# Patient Record
Sex: FEMALE | Race: WHITE | NOT HISPANIC OR LATINO | Employment: OTHER | URBAN - METROPOLITAN AREA
[De-identification: names, ages, dates, MRNs, and addresses within clinical notes are randomized per-mention and may not be internally consistent; named-entity substitution may affect disease eponyms.]

---

## 2024-01-22 ENCOUNTER — TELEMEDICINE (OUTPATIENT)
Dept: PRIMARY CARE | Facility: CLINIC | Age: 69
End: 2024-01-22
Payer: COMMERCIAL

## 2024-01-22 DIAGNOSIS — Z00.00 HEALTH CARE MAINTENANCE: Primary | ICD-10-CM

## 2024-01-22 DIAGNOSIS — J20.9 CHRONIC BRONCHITIS WITH ACUTE EXACERBATION (MULTI): ICD-10-CM

## 2024-01-22 DIAGNOSIS — J42 CHRONIC BRONCHITIS WITH ACUTE EXACERBATION (MULTI): ICD-10-CM

## 2024-01-22 DIAGNOSIS — R53.83 OTHER FATIGUE: ICD-10-CM

## 2024-01-22 PROCEDURE — 99213 OFFICE O/P EST LOW 20 MIN: CPT | Performed by: INTERNAL MEDICINE

## 2024-01-22 RX ORDER — AZITHROMYCIN 250 MG/1
TABLET, FILM COATED ORAL
Qty: 6 TABLET | Refills: 0 | Status: SHIPPED | OUTPATIENT
Start: 2024-01-22 | End: 2024-01-27

## 2024-01-22 NOTE — PROGRESS NOTES
Subjective   Patient ID: Lizbet Gonzalez is a 68 y.o. female who presents for No chief complaint on file..    HPI virtual video visit This visit was completed via audio and visual secondary to the restrictions of the COVID-19 pandemic all medical issues were addressed and discussed with patient patient was not otherwise examined it was felt that the patient needed to be seen in the office they would have been referred to do so patient verbally consented to visit  Sick visit three months of coughing have been ongoing little in the way fever sometimes some productive no chest pain no shortness of breath over-the-counter medications have not been as helpful rundown with coughing and other issues    Review of Systems    Objective   There were no vitals taken for this visit.    Physical Exam alert and oriented x 3 breathing unlabored not otherwise examined    Assessment/Plan  impression CB with cough fatigue  Plan discussed with rechecking on blood work had mentioned with losing weight and Ozempic discussed with prior blood work and encouraged for rechecking overall health lungs and cholesterol she had questions about dermatology good overall diet regular exercise increase water consumption okay for Mucinex twice daily okay for Zithromax 250 mg tablet take 2 tablets first day 1 for each of the next 4 days #1 traditional z neelam films if no better or follow-up with pulmonary and recheck with Derm pulmonary and follow-up for regular visit and blood work and consideration of other medications

## 2024-02-06 ENCOUNTER — OFFICE VISIT (OUTPATIENT)
Dept: PRIMARY CARE | Facility: CLINIC | Age: 69
End: 2024-02-06
Payer: COMMERCIAL

## 2024-02-06 VITALS — WEIGHT: 198 LBS | DIASTOLIC BLOOD PRESSURE: 80 MMHG | SYSTOLIC BLOOD PRESSURE: 140 MMHG

## 2024-02-06 DIAGNOSIS — Z00.00 HEALTH CARE MAINTENANCE: Primary | ICD-10-CM

## 2024-02-06 DIAGNOSIS — E78.2 MIXED HYPERLIPIDEMIA: ICD-10-CM

## 2024-02-06 DIAGNOSIS — K76.0 FATTY LIVER: ICD-10-CM

## 2024-02-06 DIAGNOSIS — Z12.11 ENCOUNTER FOR SCREENING FOR MALIGNANT NEOPLASM OF COLON: ICD-10-CM

## 2024-02-06 DIAGNOSIS — R03.0 BLOOD PRESSURE ELEVATED WITHOUT HISTORY OF HTN: ICD-10-CM

## 2024-02-06 DIAGNOSIS — R73.02 GLUCOSE INTOLERANCE (IMPAIRED GLUCOSE TOLERANCE): ICD-10-CM

## 2024-02-06 PROCEDURE — 1160F RVW MEDS BY RX/DR IN RCRD: CPT | Performed by: INTERNAL MEDICINE

## 2024-02-06 PROCEDURE — G0439 PPPS, SUBSEQ VISIT: HCPCS | Performed by: INTERNAL MEDICINE

## 2024-02-06 PROCEDURE — 1159F MED LIST DOCD IN RCRD: CPT | Performed by: INTERNAL MEDICINE

## 2024-02-06 PROCEDURE — 1036F TOBACCO NON-USER: CPT | Performed by: INTERNAL MEDICINE

## 2024-02-06 PROCEDURE — 1170F FXNL STATUS ASSESSED: CPT | Performed by: INTERNAL MEDICINE

## 2024-02-06 PROCEDURE — 99214 OFFICE O/P EST MOD 30 MIN: CPT | Performed by: INTERNAL MEDICINE

## 2024-02-06 NOTE — PROGRESS NOTES
Subjective   Patient ID: Lizbet Gonzalez is a 68 y.o. female who presents for No chief complaint on file..    HPI CPE see updated front sheet her cough and cold symptoms have cleared she has been seeing the dermatologist for her acne and in doing so was begun on Ozempic she is on 0.25 mg that is the only other medicine she takes besides her oral antibiotic for the above feels well started out at around 210 pounds so has lost some weight she has only been taking it for 3 weeks so far bowels normal no dysuria bones muscles joints okay reviewed prior issues including liver and cholesterol    Past medical history hyperlipidemia fatty liver    Medications Ozempic antibiotic    Allergies no known drug allergies    Social history no tobacco stop drinking alcohol    Family history  has since passed away father and maternal grandmother and maternal aunt all with diabetes    Prevention not as much exercise has not had mammogram has not had recent colonoscopy has not had flu shot did have some original COVID vaccinations has not seen GYN    Depression screen not depressed    Review of Systems    Objective   There were no vitals taken for this visit.    Physical Exam vital signs noted alert and oriented x 3 NCAT PERRLA EOMI nares without discharge OP benign TM normal bilateral EAC clear bilateral no AC nodes no JVD or bruit no thyromegaly chest clear to auscultation CV regular rate and rhythm S1-S2 without murmur gallop or rub LS spine normal curvature negative straight leg raise negative logroll negative SI joint tenderness abdomen soft nontender normal active bowel sounds extremities no clubbing cyanosis or edema normal distal pulses DTR 2+ and brisk    Assessment/Plan    impression General medical examination elevated blood pressure without hypertension hyperlipidemia fatty liver other diagnoses glucose intolerance  Plan she is having blood work from the dermatologist including hepatic panel lipid panel and CBC will  add Chem-7 advised on glucose potassium and kidney function and glycohemoglobin advised on long-term blood sugar test she will have these drawn later in the week diet weight loss exercise especially the exercise component she will hold on mammogram but encouraged GYN and mammogram encouraged colonoscopy she will consider in the meantime okay for Cologuard requisition made good water consumption watch salt and NSAIDs regarding blood pressure follow-up with dermatologist for adults acne and recheck 3 to 6 months based on labs TT 60 cc 31

## 2024-02-11 ASSESSMENT — ACTIVITIES OF DAILY LIVING (ADL)
DOING_HOUSEWORK: INDEPENDENT
TAKING_MEDICATION: INDEPENDENT
GROCERY_SHOPPING: INDEPENDENT
DRESSING: INDEPENDENT
BATHING: INDEPENDENT
MANAGING_FINANCES: INDEPENDENT

## 2024-02-11 ASSESSMENT — PATIENT HEALTH QUESTIONNAIRE - PHQ9
SUM OF ALL RESPONSES TO PHQ9 QUESTIONS 1 AND 2: 0
1. LITTLE INTEREST OR PLEASURE IN DOING THINGS: NOT AT ALL
2. FEELING DOWN, DEPRESSED OR HOPELESS: NOT AT ALL

## 2024-02-11 ASSESSMENT — ENCOUNTER SYMPTOMS
OCCASIONAL FEELINGS OF UNSTEADINESS: 0
DEPRESSION: 0
LOSS OF SENSATION IN FEET: 0

## 2024-02-21 LAB — NONINV COLON CA DNA+OCC BLD SCRN STL QL: POSITIVE

## 2024-12-31 ENCOUNTER — HOSPITAL ENCOUNTER (EMERGENCY)
Facility: HOSPITAL | Age: 69
Discharge: HOME | End: 2024-12-31
Payer: COMMERCIAL

## 2024-12-31 ENCOUNTER — APPOINTMENT (OUTPATIENT)
Dept: RADIOLOGY | Facility: HOSPITAL | Age: 69
End: 2024-12-31
Payer: COMMERCIAL

## 2024-12-31 VITALS
HEIGHT: 64 IN | BODY MASS INDEX: 25.61 KG/M2 | HEART RATE: 91 BPM | WEIGHT: 150 LBS | RESPIRATION RATE: 18 BRPM | SYSTOLIC BLOOD PRESSURE: 142 MMHG | TEMPERATURE: 99.1 F | OXYGEN SATURATION: 98 % | DIASTOLIC BLOOD PRESSURE: 85 MMHG

## 2024-12-31 DIAGNOSIS — M24.542 CONTRACTURE, LEFT HAND: ICD-10-CM

## 2024-12-31 DIAGNOSIS — J10.1 INFLUENZA A: Primary | ICD-10-CM

## 2024-12-31 PROBLEM — E66.9 OBESITY: Status: ACTIVE | Noted: 2024-12-31

## 2024-12-31 PROBLEM — K21.9 GERD (GASTROESOPHAGEAL REFLUX DISEASE): Status: ACTIVE | Noted: 2024-12-31

## 2024-12-31 PROBLEM — R73.02 GLUCOSE INTOLERANCE (IMPAIRED GLUCOSE TOLERANCE): Status: ACTIVE | Noted: 2024-12-31

## 2024-12-31 LAB
FLUAV RNA RESP QL NAA+PROBE: DETECTED
FLUBV RNA RESP QL NAA+PROBE: NOT DETECTED
SARS-COV-2 RNA RESP QL NAA+PROBE: NOT DETECTED

## 2024-12-31 PROCEDURE — 99284 EMERGENCY DEPT VISIT MOD MDM: CPT | Mod: 25

## 2024-12-31 PROCEDURE — 71046 X-RAY EXAM CHEST 2 VIEWS: CPT | Mod: FOREIGN READ | Performed by: RADIOLOGY

## 2024-12-31 PROCEDURE — 2500000001 HC RX 250 WO HCPCS SELF ADMINISTERED DRUGS (ALT 637 FOR MEDICARE OP): Performed by: PHYSICIAN ASSISTANT

## 2024-12-31 PROCEDURE — 87636 SARSCOV2 & INF A&B AMP PRB: CPT | Performed by: PHYSICIAN ASSISTANT

## 2024-12-31 PROCEDURE — 71046 X-RAY EXAM CHEST 2 VIEWS: CPT

## 2024-12-31 RX ORDER — OSELTAMIVIR PHOSPHATE 75 MG/1
75 CAPSULE ORAL EVERY 12 HOURS
Qty: 10 CAPSULE | Refills: 0 | Status: SHIPPED | OUTPATIENT
Start: 2024-12-31 | End: 2025-01-05

## 2024-12-31 RX ORDER — IBUPROFEN 600 MG/1
600 TABLET ORAL ONCE
Status: COMPLETED | OUTPATIENT
Start: 2024-12-31 | End: 2024-12-31

## 2024-12-31 RX ADMIN — IBUPROFEN 600 MG: 600 TABLET, FILM COATED ORAL at 08:44

## 2024-12-31 ASSESSMENT — PAIN DESCRIPTION - PAIN TYPE: TYPE: ACUTE PAIN

## 2024-12-31 ASSESSMENT — PAIN - FUNCTIONAL ASSESSMENT: PAIN_FUNCTIONAL_ASSESSMENT: 0-10

## 2024-12-31 ASSESSMENT — PAIN SCALES - GENERAL: PAINLEVEL_OUTOF10: 4

## 2024-12-31 ASSESSMENT — COLUMBIA-SUICIDE SEVERITY RATING SCALE - C-SSRS
2. HAVE YOU ACTUALLY HAD ANY THOUGHTS OF KILLING YOURSELF?: NO
6. HAVE YOU EVER DONE ANYTHING, STARTED TO DO ANYTHING, OR PREPARED TO DO ANYTHING TO END YOUR LIFE?: NO
1. IN THE PAST MONTH, HAVE YOU WISHED YOU WERE DEAD OR WISHED YOU COULD GO TO SLEEP AND NOT WAKE UP?: NO

## 2024-12-31 ASSESSMENT — PAIN DESCRIPTION - PROGRESSION: CLINICAL_PROGRESSION: NOT CHANGED

## 2024-12-31 ASSESSMENT — PAIN DESCRIPTION - FREQUENCY: FREQUENCY: CONSTANT/CONTINUOUS

## 2024-12-31 ASSESSMENT — PAIN DESCRIPTION - ONSET: ONSET: GRADUAL

## 2024-12-31 ASSESSMENT — PAIN DESCRIPTION - LOCATION: LOCATION: GENERALIZED

## 2024-12-31 NOTE — ED PROVIDER NOTES
HPI   Chief Complaint   Patient presents with    Flu Symptoms       History of present illness: 69-year-old female with history of GERD complains of a cough for the last 4 days.  Cough is occasionally productive with clear sputum.  Has associated rhinorrhea congestion sneezing and lacrimation.  Says that she has a sore throat with coughing only.  She has been taking Advil on and off offering some relief of her pain.  She has associated myalgias and generalized fatigue.  Says that she vomited 3 days ago and currently is not nauseous.  She had some diarrhea 3 days ago.  She has been eating a little less than typical but has not had persistent diarrhea or vomiting.  Denies any dysuria polyuria chest pain shortness of breath neck pain neck stiffness headache lightheadedness dizziness diplopia blurred vision paresthesias incontinence.    Review of systems: Patient also mentions she has a contracture on her left hand that has been there for weeks.  She denies any specific injuries or pain.  Constitutional, eye, ENT, cardiovascular, respiratory, gastrointestinal, genitourinary, neurologic, musculoskeletal, dermatologic, hematologic, endocrine systems were evaluated and were negative unless otherwise specified in history of present illness.    Medications: Reviewed and per nursing note.    Family history: Denies relevant medical conditions.    Past medical history: GERD       physical exam:    Appearance: Well-developed, well-nourished, nontoxic-appearing, alert and oriented x3. Talking in complete sentences.    HEENT: Inflamed nasal turbinates with rhinorrhea.  Head normocephalic atraumatic, extraocular movements intact, pupils equal round reactive to light, mucous membranes are moist and pink.  No pharyngeal erythema edema or exudates.  No stridor drooling or trismus.  No induration of floor of mouth.  Normal tympanic membranes.    NECK:  Nml Inspection, no meningismus, no thyromegaly, no lymphadenopathy, no JVD, trachea  is midline.    Respiratory: Clear to auscultation bilaterally with normal bilateral excursion. No wheezes, rhonchi, crackles.    Cardiovascular: Regular rate and rhythm, no murmurs, rubs or gallops. Pulses 2+ symmetrically in the dorsalis pedis and radial pulses.    Abdomen/GI:  Soft, nontender, nondistended, normal bowel sounds x4. No masses or organomegaly.    :  No CVA tenderness    Neuro:  Oriented x 3, Speech Clear, cranial nerves grossly intact. Normal sensation to light touch in all 4 extremities.    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  Patient spontaneously moves all 4 extremities.    Skin:  No cyanosis, clubbing, edema, open wounds, or rashes.    Social history: Denies tobacco, alcohol, drug use.                  Patient History   History reviewed. No pertinent past medical history.  History reviewed. No pertinent surgical history.  No family history on file.  Social History     Tobacco Use    Smoking status: Never    Smokeless tobacco: Never   Substance Use Topics    Alcohol use: Not on file    Drug use: Not on file       Physical Exam   ED Triage Vitals [12/31/24 0800]   Temperature Heart Rate Respirations BP   37.3 °C (99.1 °F) 91 18 142/85      Pulse Ox Temp Source Heart Rate Source Patient Position   98 % Temporal Monitor Sitting      BP Location FiO2 (%)     Left arm --       Physical Exam      ED Course & MDM   Diagnoses as of 12/31/24 1019   Influenza A   Contracture, left hand                 No data recorded     Carleen Coma Scale Score: 15 (12/31/24 0803 : Claritza Quintana RN)                           Medical Decision Making  Labs Reviewed  SARS-COV-2 AND INFLUENZA A/B PCR - Abnormal    XR chest 2 views   Final Result    No acute findings.      Signed by Josh Bennett MD         Patient complains of cough and congestion.  Differential diagnosis of COVID-19, influenza, pneumonia, otitis media, tonsillitis, meningitis, sinusitis.  Examination shows  lungs clear to auscultation, normal tympanic membranes, no meningeal signs, no sinus tenderness making pneumonia, otitis media, meningitis, sinusitis unlikely.      Chest x-ray influenza COVID-19 swabs ordered.    Chest x-ray COVID test showed no acute findings.  Positive influenza A.  Patient will be treated for influenza A with Tamiflu.  Educated supportive care.  Afebrile nontoxic-appearing without evidence of complication such as pneumonia.    Patient will be discharged to home with prescription for Tamiflu.  Patient is educated in signs and symptoms of worsening symptoms and reasons to come back to the emergency department.  Will need to follow up with primary care provider.  Patient also has contracture of left hand on the fourth flexor.  This may be injury or Dupuytren contracture.  Given referral to hand as needed.  Patient does not report social determinants of health impacting ability to obtain care that is needed.  Patient agrees with plan.    This is a transcription.  Text was reviewed for errors, but some transcription errors may remain.  Please call for any questions.          Procedure  Procedures     Dao Hernandez PA-C  12/31/24 1016       Dao Hernandez PA-C  12/31/24 1019

## 2024-12-31 NOTE — ED TRIAGE NOTES
Patient presents to ED from home for flu-like symptoms that included fever, cough, and body aches that started Saturday.